# Patient Record
(demographics unavailable — no encounter records)

---

## 2025-07-03 NOTE — HISTORY OF PRESENT ILLNESS
[FreeTextEntry2] : Valeri Is a 15-year-old who has been diagnosed with Prader-Willi syndrome.  At one time she was followed at [place] by Dr. Trinh but subsequently transferred to [facility].  Valeri underwent a tonsillectomy and adenoidectomy after which she was started on growth hormone.  She also was on Lupron for precocious puberty.  Her growth hormone was stopped approximately 2 years ago  Mom presents today as she wants to know about GLP-1 medications for weight loss.  Valeri  currently goes to special needs school.  She does have pubertal development but has primary amenorrhea.  Mom reports that Cyndee loses weight over the summer.  She tries to eat healthy.

## 2025-07-03 NOTE — HISTORY OF PRESENT ILLNESS
[FreeTextEntry2] : Valeri Is a 15-year-old who has been diagnosed with Prader-Willi syndrome.  At one time she was followed at [place] by Dr. Trinh but subsequently transferred to [facility].  Valeri underwent a tonsillectomy and adenoidectomy after which she was started on growth hormone.  She also was on Lupron for precocious puberty.  Her growth hormone was stopped approximately 2 years ago  Mom presents today as she wants to know about GLP-1 medications for weight loss.  Valeri  currently goes to special needs school.  She does have pubertal development but has primary amenorrhea.  Mom reports that Cyndee loses weight over the summer.  She tries to eat healthy.